# Patient Record
Sex: FEMALE | Race: BLACK OR AFRICAN AMERICAN | Employment: FULL TIME | URBAN - METROPOLITAN AREA
[De-identification: names, ages, dates, MRNs, and addresses within clinical notes are randomized per-mention and may not be internally consistent; named-entity substitution may affect disease eponyms.]

---

## 2022-08-02 ENCOUNTER — APPOINTMENT (OUTPATIENT)
Dept: CT IMAGING | Age: 32
End: 2022-08-02
Attending: EMERGENCY MEDICINE
Payer: MEDICAID

## 2022-08-02 ENCOUNTER — APPOINTMENT (OUTPATIENT)
Dept: GENERAL RADIOLOGY | Age: 32
End: 2022-08-02
Attending: EMERGENCY MEDICINE
Payer: MEDICAID

## 2022-08-02 ENCOUNTER — HOSPITAL ENCOUNTER (EMERGENCY)
Age: 32
Discharge: HOME | End: 2022-08-02
Attending: EMERGENCY MEDICINE
Payer: MEDICAID

## 2022-08-02 VITALS
SYSTOLIC BLOOD PRESSURE: 101 MMHG | RESPIRATION RATE: 14 BRPM | HEIGHT: 68 IN | WEIGHT: 185 LBS | DIASTOLIC BLOOD PRESSURE: 59 MMHG | TEMPERATURE: 98.2 F | OXYGEN SATURATION: 100 % | BODY MASS INDEX: 28.04 KG/M2 | HEART RATE: 68 BPM

## 2022-08-02 DIAGNOSIS — S73.004A DISLOCATION OF RIGHT HIP, INITIAL ENCOUNTER (HCC): Primary | ICD-10-CM

## 2022-08-02 LAB
ANION GAP SERPL CALC-SCNC: 9 MMOL/L (ref 3–18)
BASOPHILS # BLD: 0.1 K/UL (ref 0–0.1)
BASOPHILS NFR BLD: 1 % (ref 0–2)
BUN SERPL-MCNC: 9 MG/DL (ref 7–18)
BUN/CREAT SERPL: 11 (ref 12–20)
CALCIUM SERPL-MCNC: 8.9 MG/DL (ref 8.5–10.1)
CHLORIDE SERPL-SCNC: 112 MMOL/L (ref 100–111)
CO2 SERPL-SCNC: 23 MMOL/L (ref 21–32)
CREAT SERPL-MCNC: 0.82 MG/DL (ref 0.6–1.3)
DIFFERENTIAL METHOD BLD: ABNORMAL
EOSINOPHIL # BLD: 0 K/UL (ref 0–0.4)
EOSINOPHIL NFR BLD: 0 % (ref 0–5)
ERYTHROCYTE [DISTWIDTH] IN BLOOD BY AUTOMATED COUNT: 13.9 % (ref 11.6–14.5)
GLUCOSE SERPL-MCNC: 104 MG/DL (ref 74–99)
HCT VFR BLD AUTO: 35.8 % (ref 35–45)
HGB BLD-MCNC: 11.5 G/DL (ref 12–16)
IMM GRANULOCYTES # BLD AUTO: 0.1 K/UL (ref 0–0.04)
IMM GRANULOCYTES NFR BLD AUTO: 1 % (ref 0–0.5)
LYMPHOCYTES # BLD: 0.9 K/UL (ref 0.9–3.6)
LYMPHOCYTES NFR BLD: 8 % (ref 21–52)
MCH RBC QN AUTO: 27.1 PG (ref 24–34)
MCHC RBC AUTO-ENTMCNC: 32.1 G/DL (ref 31–37)
MCV RBC AUTO: 84.2 FL (ref 78–100)
MONOCYTES # BLD: 0.3 K/UL (ref 0.05–1.2)
MONOCYTES NFR BLD: 3 % (ref 3–10)
NEUTS SEG # BLD: 9.5 K/UL (ref 1.8–8)
NEUTS SEG NFR BLD: 88 % (ref 40–73)
NRBC # BLD: 0 K/UL (ref 0–0.01)
NRBC BLD-RTO: 0 PER 100 WBC
PLATELET # BLD AUTO: 338 K/UL (ref 135–420)
PMV BLD AUTO: 9.1 FL (ref 9.2–11.8)
POTASSIUM SERPL-SCNC: 3.7 MMOL/L (ref 3.5–5.5)
RBC # BLD AUTO: 4.25 M/UL (ref 4.2–5.3)
SODIUM SERPL-SCNC: 144 MMOL/L (ref 136–145)
WBC # BLD AUTO: 10.8 K/UL (ref 4.6–13.2)

## 2022-08-02 PROCEDURE — 99152 MOD SED SAME PHYS/QHP 5/>YRS: CPT

## 2022-08-02 PROCEDURE — 96374 THER/PROPH/DIAG INJ IV PUSH: CPT

## 2022-08-02 PROCEDURE — 72170 X-RAY EXAM OF PELVIS: CPT

## 2022-08-02 PROCEDURE — 74011250636 HC RX REV CODE- 250/636

## 2022-08-02 PROCEDURE — 96372 THER/PROPH/DIAG INJ SC/IM: CPT

## 2022-08-02 PROCEDURE — 74011250637 HC RX REV CODE- 250/637: Performed by: EMERGENCY MEDICINE

## 2022-08-02 PROCEDURE — 74011250636 HC RX REV CODE- 250/636: Performed by: EMERGENCY MEDICINE

## 2022-08-02 PROCEDURE — 99285 EMERGENCY DEPT VISIT HI MDM: CPT

## 2022-08-02 PROCEDURE — 75810000301 HC ER LEVEL 1 CLOSED TREATMNT FRACTURE/DISLOCATION

## 2022-08-02 PROCEDURE — 73700 CT LOWER EXTREMITY W/O DYE: CPT

## 2022-08-02 PROCEDURE — 85025 COMPLETE CBC W/AUTO DIFF WBC: CPT

## 2022-08-02 PROCEDURE — 80048 BASIC METABOLIC PNL TOTAL CA: CPT

## 2022-08-02 RX ORDER — KETOROLAC TROMETHAMINE 30 MG/ML
60 INJECTION, SOLUTION INTRAMUSCULAR; INTRAVENOUS
Status: COMPLETED | OUTPATIENT
Start: 2022-08-02 | End: 2022-08-02

## 2022-08-02 RX ORDER — HYDROMORPHONE HYDROCHLORIDE 1 MG/ML
1 INJECTION, SOLUTION INTRAMUSCULAR; INTRAVENOUS; SUBCUTANEOUS ONCE
Status: COMPLETED | OUTPATIENT
Start: 2022-08-02 | End: 2022-08-02

## 2022-08-02 RX ORDER — MORPHINE SULFATE 4 MG/ML
4 INJECTION INTRAVENOUS
Status: COMPLETED | OUTPATIENT
Start: 2022-08-02 | End: 2022-08-02

## 2022-08-02 RX ORDER — PROPOFOL 10 MG/ML
INJECTION, EMULSION INTRAVENOUS
Status: COMPLETED
Start: 2022-08-02 | End: 2022-08-02

## 2022-08-02 RX ORDER — PROPOFOL 10 MG/ML
200 INJECTION, EMULSION INTRAVENOUS
Status: COMPLETED | OUTPATIENT
Start: 2022-08-02 | End: 2022-08-02

## 2022-08-02 RX ORDER — ONDANSETRON 4 MG/1
4 TABLET, ORALLY DISINTEGRATING ORAL
Status: COMPLETED | OUTPATIENT
Start: 2022-08-02 | End: 2022-08-02

## 2022-08-02 RX ADMIN — KETOROLAC TROMETHAMINE 60 MG: 30 INJECTION, SOLUTION INTRAMUSCULAR at 01:52

## 2022-08-02 RX ADMIN — HYDROMORPHONE HYDROCHLORIDE 1 MG: 1 INJECTION, SOLUTION INTRAMUSCULAR; INTRAVENOUS; SUBCUTANEOUS at 05:29

## 2022-08-02 RX ADMIN — ONDANSETRON 4 MG: 4 TABLET, ORALLY DISINTEGRATING ORAL at 03:56

## 2022-08-02 RX ADMIN — PROPOFOL 100 MG: 10 INJECTION, EMULSION INTRAVENOUS at 07:17

## 2022-08-02 RX ADMIN — SODIUM CHLORIDE 1000 ML: 9 INJECTION, SOLUTION INTRAVENOUS at 05:29

## 2022-08-02 RX ADMIN — MORPHINE SULFATE 4 MG: 4 INJECTION INTRAVENOUS at 03:56

## 2022-08-02 NOTE — Clinical Note
[facility] [place]  THE [facility] EMERGENCY DEPT  [address]  [facility] [id number] [phone]    Work/School Note    Date: 8/2/2022    To Whom It May concern:      Nelson Serrano was seen and treated today in the emergency room by the following provider(s):  Attending Provider: Jonathan Bettencourt MD.      Nelson Serrano is excused from work/school on 08/02/22. She is clear to return to work/school on 08/03/22.         Sincerely,          Juventino Arnold RN

## 2022-08-02 NOTE — Clinical Note
[facility] [place]  [facility]  [address] [facility] [id number] [phone]    Work/School Note    Date: 8/2/2022    To Whom It May concern:      Edward Wilson was seen and treated today in the emergency room by the following provider(s):  Attending Provider: Cadence Dotson MD.      Edward Wilson is excused from work/school on 08/02/22. She is clear to return to work/school on 08/03/22.         Sincerely,          Tuyet Freeman MD

## 2022-08-02 NOTE — ED NOTES
9:25 AM     Assumed care of patient from Dr. Yumiko Alonso. Patient complaint(s), current treatment plan, progression and available diagnostic results have been discussed thoroughly. When awake, can walk, can go with someone home       Patient became sober, discharge home with family.    Ambulated w/o difficulty    Susan Briceño MD, [facility]  Emergency Medicine
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
## 2022-08-02 NOTE — ED NOTES
Heat packs applied and Pt repositioned for comfort. Pt endorses being unable to move R leg for x rays. MD to be made aware.

## 2022-08-02 NOTE — DISCHARGE INSTRUCTIONS
Please carefully read all discharge instructions    Please follow-up with a primary care physician and if you do not have one currently use the contact information provided to obtain an appointment. If none was provided please call the number on the back of your insurance card to locate a Primary care doctor. Many offices have \"cancellation lists\" that you can ask to be placed on; should a patient with an earlier appointment cancel you will be notified to take their place. Please return to the Emergency Room immediately if your symptoms worsen. Please return to the Emergency Department if you develop a fever, chills, cannot eat or drink due to nausea or vomiting, or if any of your symptoms worsen. If you do not have insurance you can use the below for your medications. [employer]."[employer][email or web address]    What are GoodRx coupons? GoodRx coupons will help you pay less than the cash price for your prescription. Liyah Sieve free to use and are accepted at virtually every [place] pharmacy. Your pharmacist will know how to enter the codes on the coupon to pull up the lowest discount available. "[employer] Activation    Thank you for requesting access to "[employer]. Please follow the instructions below to securely access and download your online medical record. "[employer] allows you to send messages to your doctor, view your test results, renew your prescriptions, schedule appointments, and more. How Do I Sign Up? In your internet browser, go to [email or web address]  Click on the First Time User? Click Here link in the Sign In box. You will be redirect to the New Member Sign Up page. Enter your "[employer] Access Code exactly as it appears below. You will not need to use this code after youve completed the sign-up process. If you do not sign up before the expiration date, you must request a new code.     "[employer] Access Code: [email or web address]  Expires: 9/16/2022  1:30 AM    Enter the last four digits of your Social Security Number (xxxx) and Date of Birth (mm/dd/yyyy) as indicated and click Submit. You will be taken to the next sign-up page. Create a Rewarding Return ID. This will be your Rewarding Return login ID and cannot be changed, so think of one that is secure and easy to remember. Create a Rewarding Return password. You can change your password at any time. Enter your Password Reset Question and Answer. This can be used at a later time if you forget your password. Enter your e-mail address. You will receive e-mail notification when new information is available in [address] Click Sign Up. You can now view and download portions of your medical record. Click the [employer] link to download a portable copy of your medical information. Additional Information    If you have questions, please visit the Frequently Asked Questions section of the [employer] website at [email or web address] Remember, Rew[employer] Return is NOT to be used for urgent needs. For medical emergencies, dial 911.
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
